# Patient Record
Sex: MALE | Race: AMERICAN INDIAN OR ALASKA NATIVE
[De-identification: names, ages, dates, MRNs, and addresses within clinical notes are randomized per-mention and may not be internally consistent; named-entity substitution may affect disease eponyms.]

---

## 2022-09-09 ENCOUNTER — HOSPITAL ENCOUNTER (EMERGENCY)
Dept: HOSPITAL 5 - ED | Age: 32
Discharge: HOME | End: 2022-09-09
Payer: COMMERCIAL

## 2022-09-09 VITALS — SYSTOLIC BLOOD PRESSURE: 120 MMHG | DIASTOLIC BLOOD PRESSURE: 80 MMHG

## 2022-09-09 DIAGNOSIS — J02.9: Primary | ICD-10-CM

## 2022-09-09 DIAGNOSIS — Z79.899: ICD-10-CM

## 2022-09-09 PROCEDURE — 99282 EMERGENCY DEPT VISIT SF MDM: CPT

## 2022-09-09 NOTE — EMERGENCY DEPARTMENT REPORT
ED ENT HPI





- General


Chief complaint: Sore Throat


Stated complaint: SORE THROAT, SCABS ON HAND


Time Seen by Provider: 09/09/22 18:16


Source: patient


Mode of arrival: Ambulatory


Limitations: No Limitations





- History of Present Illness


Initial comments: 





32 yo black male with no pmh presents to ed for evaluation of one day history of

sore throat.  He denies fever, ha, abdominal pain, sob, and cp.  He states that 

he also has some abrasions to hand secondary to some training that he has been 

doing at this [employer].  


MD complaint: sore throat


-: Gradual, days(s) (1)


Location: throat


Severity: mild


Severity scale (0 -10): 2


Quality: aching


Consistency: constant


Worsens with: swallowing


Associated Symptoms: pain with swallowing, sore throat.  denies: fever, cough, 

gum swelling, toothache, tinnitus, hearing loss, discharge from ear, rhinorrhea





- Related Data


                                  Previous Rx's











 Medication  Instructions  Recorded  Last Taken  Type


 


Nystas/Diphen/Xyl Visc/Mylanta 30 ml MM Q4H PRN #240 ml 09/09/22 Unknown Rx





[Magic Mouthwash]    


 


methylPREDNISolone [Medrol 4MG 4 mg PO DAILY #1 pack 09/09/22 Unknown Rx





DOSEPAK (21 tabs)]    











                                    Allergies











Allergy/AdvReac Type Severity Reaction Status Date / Time


 


No Known Allergies Allergy   Verified 09/09/22 16:20














ED Dental HPI





- General


Chief complaint: Sore Throat


Stated complaint: SORE THROAT, SCABS ON HAND


Time Seen by Provider: 09/09/22 18:16


Source: patient


Mode of arrival: Ambulatory


Limitations: No Limitations





- Related Data


                                  Previous Rx's











 Medication  Instructions  Recorded  Last Taken  Type


 


Nystas/Diphen/Xyl Visc/Mylanta 30 ml MM Q4H PRN #240 ml 09/09/22 Unknown Rx





[Magic Mouthwash]    


 


methylPREDNISolone [Medrol 4MG 4 mg PO DAILY #1 pack 09/09/22 Unknown Rx





DOSEPAK (21 tabs)]    











                                    Allergies











Allergy/AdvReac Type Severity Reaction Status Date / Time


 


No Known Allergies Allergy   Verified 09/09/22 16:20














ED Review of Systems


ROS: 


Stated complaint: SORE THROAT, SCABS ON HAND


Other details as noted in HPI





Comment: All other systems reviewed and negative


Constitutional: denies: chills, fever


Eyes: denies: eye discharge, vision change


ENT: throat pain, congestion.  denies: ear pain


Respiratory: denies: shortness of breath


Cardiovascular: denies: chest pain, palpitations


Gastrointestinal: denies: abdominal pain, nausea, vomiting


Genitourinary: denies: urgency, dysuria


Musculoskeletal: denies: back pain


Neurological: denies: headache, weakness





ED Past Medical Hx





- Medications


Home Medications: 


                                Home Medications











 Medication  Instructions  Recorded  Confirmed  Last Taken  Type


 


Nystas/Diphen/Xyl Visc/Mylanta 30 ml MM Q4H PRN #240 ml 09/09/22  Unknown Rx





[Magic Mouthwash]     


 


methylPREDNISolone [Medrol 4MG 4 mg PO DAILY #1 pack 09/09/22  Unknown Rx





DOSEPAK (21 tabs)]     














ED Physical Exam





- General


Limitations: No Limitations


General appearance: alert, in no apparent distress





- Head


Head exam: Present: atraumatic, normocephalic, normal inspection





- Eye


Eye exam: Present: normal appearance.  Absent: conjunctival injection





- ENT


ENT exam: Present: normal exam





- Expanded ENT Exam


  ** Expanded


Throat exam: Negative: tonsillar erythema, tonsillomegaly, tonsillar exudate, R 

peritonsillar mass, L peritonsillar mass





- Neck


Neck exam: Present: normal inspection, full ROM.  Absent: tenderness, 

lymphadenopathy





- Respiratory


Respiratory exam: Present: normal lung sounds bilaterally.  Absent: respiratory 

distress, wheezes, rales, rhonchi, chest wall tenderness





- Cardiovascular


Cardiovascular Exam: Present: regular rate, normal heart sounds





- GI/Abdominal


GI/Abdominal exam: Present: normal bowel sounds.  Absent: distended, tenderness,

guarding, rebound, rigid





- Extremities Exam


Extremities exam: Present: normal inspection, full ROM, normal capillary refill.

 Absent: pedal edema, joint swelling, calf tenderness





- Back Exam


Back exam: Present: normal inspection.  Absent: CVA tenderness (R), CVA 

tenderness (L)





- Neurological Exam


Neurological exam: Present: alert, oriented X3





- Psychiatric


Psychiatric exam: Present: normal affect, normal mood





- Skin


Skin exam: Present: warm, dry, intact, normal color





ED Course





                                   Vital Signs











  09/09/22





  16:18


 


Temperature 99.9 F H


 


Pulse Rate 88


 


Respiratory 14





Rate 


 


Blood Pressure 120/80





[Right] 


 


O2 Sat by Pulse 100





Oximetry 














ED Medical Decision Making





- Medical Decision Making








32 yo black male with no pmh presents to ed for evaluation of one day history of

 sore throat.  He denies fever, ha, abdominal pain, sob, and cp.  He states that

 he also has some abrasions to hand secondary to some training that he has been 

doing at this [employer]. 








Physical exam unremarkable.  Patient will be discharged home with Magic 

mouthwash and Medrol Dosepak to take as directed.  He is advised to follow with 

his primary care provider if no improvement or worsening symptoms and return to 

the emergency department as needed.  He verbalizes understanding of and 

agreement with plan of care.


Critical care attestation.: 


If time is entered above; I have spent that time in minutes in the direct care 

of this critically ill patient, excluding procedure time.








ED Disposition


Clinical Impression: 


Pharyngitis


Qualifiers:


 Pharyngitis/tonsillitis etiology: unspecified etiology Qualified Code(s): J02.9

 - Acute pharyngitis, unspecified





Disposition: 01 HOME / SELF CARE / HOMELESS


Is pt being admited?: No


Does the pt Need Aspirin: No


Condition: Stable


Instructions:  Sore Throat, Easy-to-Read


Additional Instructions: 


Take medications as prescribed.  Follow-up with your primary care provider if no

 improvement or worsening symptoms.  Return to the emergency department as 

needed.


Prescriptions: 


Nystas/Diphen/Xyl Visc/Mylanta [Magic Mouthwash] 30 ml MM Q4H PRN #240 ml


 PRN Reason: Sore Throat


methylPREDNISolone [Medrol 4MG DOSEPAK (21 tabs)] 4 mg PO DAILY #1 pack


Referrals: 


JESUS WEIR MD [Staff Physician] - 3-5 Days


Time of Disposition: 19:15